# Patient Record
Sex: MALE | Race: WHITE | NOT HISPANIC OR LATINO | URBAN - NONMETROPOLITAN AREA
[De-identification: names, ages, dates, MRNs, and addresses within clinical notes are randomized per-mention and may not be internally consistent; named-entity substitution may affect disease eponyms.]

---

## 2020-08-19 ENCOUNTER — IMPORTED ENCOUNTER (OUTPATIENT)
Dept: URBAN - NONMETROPOLITAN AREA CLINIC 1 | Facility: CLINIC | Age: 40
End: 2020-08-19

## 2020-08-19 PROCEDURE — S0620 ROUTINE OPHTHALMOLOGICAL EXA: HCPCS

## 2020-08-19 NOTE — PATIENT DISCUSSION
Myopia/Astigmatism OUDiscussed refractive status with patient. New glasses Rx given today. Continue to monitor.

## 2022-04-09 ASSESSMENT — VISUAL ACUITY
OS_SC: 20/25
OD_SC: 20/30

## 2022-04-09 ASSESSMENT — TONOMETRY
OS_IOP_MMHG: 19
OD_IOP_MMHG: 19